# Patient Record
Sex: FEMALE | URBAN - METROPOLITAN AREA
[De-identification: names, ages, dates, MRNs, and addresses within clinical notes are randomized per-mention and may not be internally consistent; named-entity substitution may affect disease eponyms.]

---

## 2023-01-17 ENCOUNTER — OFFICE VISIT (OUTPATIENT)
Dept: URBAN - METROPOLITAN AREA CLINIC 30 | Facility: CLINIC | Age: 33
End: 2023-01-17
Payer: COMMERCIAL

## 2023-01-17 DIAGNOSIS — D32.9 MENINGIOMA: Primary | ICD-10-CM

## 2023-01-17 DIAGNOSIS — H43.813 VITREOUS DEGENERATION, BILATERAL: ICD-10-CM

## 2023-01-17 DIAGNOSIS — H04.123 DRY EYE SYNDROME OF BILATERAL LACRIMAL GLANDS: ICD-10-CM

## 2023-01-17 PROCEDURE — 92004 COMPRE OPH EXAM NEW PT 1/>: CPT

## 2023-01-17 PROCEDURE — 92133 CPTRZD OPH DX IMG PST SGM ON: CPT

## 2023-01-17 ASSESSMENT — INTRAOCULAR PRESSURE
OD: 16
OS: 16

## 2023-01-17 ASSESSMENT — KERATOMETRY
OD: 42.13
OS: 41.90

## 2023-01-17 ASSESSMENT — VISUAL ACUITY
OS: 20/20
OD: 20/20

## 2023-01-17 NOTE — IMPRESSION/PLAN
Impression: Meningioma: D32.9. Plan: H/o meningioma s/p resection 2019. OCT RNFL: WNL OD, WNL OS. No e/o visual compromise. Optic nerves appear healthy and full.  

PLAN:
Return for formal 30-2 HVF at next visit

## 2023-01-17 NOTE — IMPRESSION/PLAN
Impression: Dry eye syndrome of bilateral lacrimal glands: H04.123. Plan: Dry eyes account for the patient's complaints. There is no evidence of permanent changes to the cornea. Explained condition does not have a cure and will need artificial tears for maintenance. PLAN: Patient instructed to use artificial tears (Systane or Refresh) 4-6x/daily. Explained it may take time for eyes to acclimate completely to OTC gtt regimen.